# Patient Record
Sex: MALE | Race: WHITE
[De-identification: names, ages, dates, MRNs, and addresses within clinical notes are randomized per-mention and may not be internally consistent; named-entity substitution may affect disease eponyms.]

---

## 2021-02-19 ENCOUNTER — TRANSCRIPTION ENCOUNTER (OUTPATIENT)
Age: 31
End: 2021-02-19

## 2024-03-03 ENCOUNTER — RESULT CHARGE (OUTPATIENT)
Age: 34
End: 2024-03-03

## 2024-03-03 ENCOUNTER — APPOINTMENT (OUTPATIENT)
Dept: ORTHOPEDIC SURGERY | Facility: CLINIC | Age: 34
End: 2024-03-03
Payer: COMMERCIAL

## 2024-03-03 DIAGNOSIS — Z00.00 ENCOUNTER FOR GENERAL ADULT MEDICAL EXAMINATION W/OUT ABNORMAL FINDINGS: ICD-10-CM

## 2024-03-03 DIAGNOSIS — Z78.9 OTHER SPECIFIED HEALTH STATUS: ICD-10-CM

## 2024-03-03 PROCEDURE — 20526 THER INJECTION CARP TUNNEL: CPT | Mod: LT

## 2024-03-03 PROCEDURE — 73030 X-RAY EXAM OF SHOULDER: CPT | Mod: LT

## 2024-03-03 PROCEDURE — 99204 OFFICE O/P NEW MOD 45 MIN: CPT | Mod: 25

## 2024-03-03 PROCEDURE — 72040 X-RAY EXAM NECK SPINE 2-3 VW: CPT

## 2024-03-03 NOTE — HISTORY OF PRESENT ILLNESS
[6] : 6 [5] : 5 [Tingling] : tingling [de-identified] : Pt is a 33 year old male presenting of left shoulder. Pain started 02/29/24. Pain localized along the shoulder. Some numbness/tingling radiating down to his fingers. Went to an UC (might be [facility]), they said it might be a muscle spasm. Hx of spraining his left shoulder when he was a kid. He was prescribed diclofenac, did not take it.  Advil for pain, which improved pain. Pt complains of left hand numbness to the index and middle fingers that is 24/7 x 3 days    PMH: denied Allergies:  [] : no [FreeTextEntry1] : left solder

## 2024-03-03 NOTE — ASSESSMENT
[FreeTextEntry1] : The patient was advised of the diagnosis. The natural history of the pathology was explained in full to the patient in layman's terms. All questions were answered. The risks and benefits of surgical and non-surgical treatment alternatives were explained in full to the patient.  pt provided left carpal tunnel CSI due to 24/7 symptoms pt will rto in 10-15 days for f/u care. family provided reassurance.

## 2024-03-03 NOTE — IMAGING
[de-identified] : Cervical exam shows no skin change or bony deformity. ROM: full and pain free Spurling Signs: negative Fung Signs: negative Facet Joint Compression Signs: negative There is paracervical spasm noted. DTRs: WNLs There is no TTP Gait examination: normal Pt is able to perform Tandem Gait. +Tinel Sign over the carpal tunnel / negative over the Cubital Tunnel numbness to the index and middle fingers. no atrophy to the left upper extremity.   Left shoulder examination: with full and pain free ROM / no ttp / negative impingement signs.  [Straightening consistent with spasm] : Straightening consistent with spasm [Disc space narrowing] : Disc space narrowing [Left] : left shoulder [There are no fractures, subluxations or dislocations. No significant abnormalities are seen] : There are no fractures, subluxations or dislocations. No significant abnormalities are seen [Type 2 acromion] : Type 2 acromion [FreeTextEntry1] : C5-6 ddd

## 2024-03-11 ENCOUNTER — APPOINTMENT (OUTPATIENT)
Dept: ORTHOPEDIC SURGERY | Facility: CLINIC | Age: 34
End: 2024-03-11
Payer: COMMERCIAL

## 2024-03-11 VITALS — HEIGHT: 68 IN | BODY MASS INDEX: 24.25 KG/M2 | WEIGHT: 160 LBS

## 2024-03-11 DIAGNOSIS — M54.2 CERVICALGIA: ICD-10-CM

## 2024-03-11 DIAGNOSIS — G56.02 CARPAL TUNNEL SYNDROME, LEFT UPPER LIMB: ICD-10-CM

## 2024-03-11 PROCEDURE — 99213 OFFICE O/P EST LOW 20 MIN: CPT

## 2024-03-11 RX ORDER — CELECOXIB 200 MG/1
200 CAPSULE ORAL DAILY
Qty: 28 | Refills: 2 | Status: ACTIVE | COMMUNITY
Start: 2024-03-11 | End: 1900-01-01

## 2024-03-11 NOTE — IMAGING
[Straightening consistent with spasm] : Straightening consistent with spasm [Disc space narrowing] : Disc space narrowing [Left] : left shoulder [There are no fractures, subluxations or dislocations. No significant abnormalities are seen] : There are no fractures, subluxations or dislocations. No significant abnormalities are seen [Type 2 acromion] : Type 2 acromion [FreeTextEntry1] : C5-6 ddd [de-identified] : Cervical exam shows no skin change or bony deformity. ROM: full and pain free Spurling Signs: negative Fung Signs: negative Facet Joint Compression Signs: negative There is paracervical spasm noted. DTRs: WNLs There is no TTP Gait examination: normal Pt is able to perform Tandem Gait. Equivocal Tinel Sign over the carpal tunnel / negative over the Cubital Tunnel Negative Phalen/Durkan Signs. numbness to the index and middle fingers. no atrophy to the left upper extremity.   Left shoulder examination: with full and pain free ROM / no ttp / negative impingement signs.   (Previous 2 view cervical and left shoulder xrays were reviewed today).

## 2024-03-11 NOTE — ASSESSMENT
[FreeTextEntry1] : The patient was advised of the diagnosis. The natural history of the pathology was explained in full to the patient in layman's terms. All questions were answered. The risks and benefits of surgical and non-surgical treatment alternatives were explained in full to the patient.  Pt will rto in 3 weeks for f/u care. Will consider EMG at that time (ordered today for 4/01/2024).    No

## 2024-03-11 NOTE — REVIEW OF SYSTEMS
[Joint Stiffness] : joint stiffness [Joint Pain] : joint pain [Negative] : Endocrine [Joint Swelling] : no joint swelling

## 2024-03-11 NOTE — HISTORY OF PRESENT ILLNESS
[6] : 6 [5] : 5 [Tingling] : tingling [de-identified] : 3/11/2024: Mr. Hernadez is here with complaint of left arm tingling/numbness. Pt states CSI to left carpal tunnel improved his symptoms which are now intermittent and no longer 24/7. There is no hx of associated weakness.   3/3/2024: Pt is a LHD 33 year old male presenting of left shoulder. Pain started 02/29/24. Pain localized along the shoulder. Some numbness/tingling radiating down to his fingers. Went to an UC (might [facility]), they said it might be a muscle spasm. Hx of spraining his left shoulder when he was a kid. He was prescribed diclofenac, did not take it.  Advil for pain, which improved pain. Pt complains of left hand numbness to the index and middle fingers that is 24/7 x 3 days    PMH: denied Allergies: NKDA [] : Post Surgical Visit: no [FreeTextEntry1] : left solder [de-identified] : xrays

## 2024-04-24 ENCOUNTER — APPOINTMENT (OUTPATIENT)
Dept: NEUROLOGY | Facility: CLINIC | Age: 34
End: 2024-04-24